# Patient Record
Sex: FEMALE | Race: WHITE | NOT HISPANIC OR LATINO | URBAN - METROPOLITAN AREA
[De-identification: names, ages, dates, MRNs, and addresses within clinical notes are randomized per-mention and may not be internally consistent; named-entity substitution may affect disease eponyms.]

---

## 2023-10-03 ENCOUNTER — OFFICE (OUTPATIENT)
Dept: URBAN - METROPOLITAN AREA CLINIC 76 | Facility: CLINIC | Age: 43
End: 2023-10-03
Payer: OTHER GOVERNMENT

## 2023-10-03 VITALS
DIASTOLIC BLOOD PRESSURE: 81 MMHG | SYSTOLIC BLOOD PRESSURE: 114 MMHG | WEIGHT: 188 LBS | HEART RATE: 67 BPM | OXYGEN SATURATION: 98 % | HEIGHT: 67 IN

## 2023-10-03 DIAGNOSIS — R10.10 UPPER ABDOMINAL PAIN, UNSPECIFIED: ICD-10-CM

## 2023-10-03 DIAGNOSIS — R14.0 ABDOMINAL DISTENSION (GASEOUS): ICD-10-CM

## 2023-10-03 DIAGNOSIS — K59.09 OTHER CONSTIPATION: ICD-10-CM

## 2023-10-03 PROCEDURE — 99204 OFFICE O/P NEW MOD 45 MIN: CPT | Performed by: INTERNAL MEDICINE

## 2023-10-03 RX ORDER — LINACLOTIDE 145 UG/1
145 CAPSULE, GELATIN COATED ORAL
Qty: 90 | Refills: 3 | Status: ACTIVE
Start: 2023-10-03

## 2023-11-07 VITALS
SYSTOLIC BLOOD PRESSURE: 109 MMHG | WEIGHT: 180 LBS | HEART RATE: 55 BPM | RESPIRATION RATE: 17 BRPM | DIASTOLIC BLOOD PRESSURE: 59 MMHG | HEART RATE: 58 BPM | DIASTOLIC BLOOD PRESSURE: 72 MMHG | OXYGEN SATURATION: 97 % | DIASTOLIC BLOOD PRESSURE: 83 MMHG | RESPIRATION RATE: 21 BRPM | SYSTOLIC BLOOD PRESSURE: 102 MMHG | DIASTOLIC BLOOD PRESSURE: 70 MMHG | SYSTOLIC BLOOD PRESSURE: 95 MMHG | TEMPERATURE: 96.5 F | DIASTOLIC BLOOD PRESSURE: 54 MMHG | RESPIRATION RATE: 19 BRPM | DIASTOLIC BLOOD PRESSURE: 60 MMHG | TEMPERATURE: 97.4 F | SYSTOLIC BLOOD PRESSURE: 108 MMHG | SYSTOLIC BLOOD PRESSURE: 107 MMHG | HEART RATE: 63 BPM | RESPIRATION RATE: 15 BRPM | DIASTOLIC BLOOD PRESSURE: 63 MMHG | SYSTOLIC BLOOD PRESSURE: 97 MMHG | RESPIRATION RATE: 14 BRPM | SYSTOLIC BLOOD PRESSURE: 100 MMHG | SYSTOLIC BLOOD PRESSURE: 124 MMHG | DIASTOLIC BLOOD PRESSURE: 65 MMHG | SYSTOLIC BLOOD PRESSURE: 96 MMHG | OXYGEN SATURATION: 99 % | SYSTOLIC BLOOD PRESSURE: 115 MMHG | DIASTOLIC BLOOD PRESSURE: 57 MMHG | DIASTOLIC BLOOD PRESSURE: 55 MMHG | HEART RATE: 62 BPM | OXYGEN SATURATION: 100 % | RESPIRATION RATE: 16 BRPM | HEIGHT: 67 IN | DIASTOLIC BLOOD PRESSURE: 74 MMHG | HEART RATE: 76 BPM | HEART RATE: 65 BPM

## 2023-11-10 ENCOUNTER — AMBULATORY SURGICAL CENTER (OUTPATIENT)
Dept: URBAN - METROPOLITAN AREA SURGERY 17 | Facility: SURGERY | Age: 43
End: 2023-11-10
Payer: OTHER GOVERNMENT

## 2023-11-10 ENCOUNTER — OFFICE (OUTPATIENT)
Dept: URBAN - METROPOLITAN AREA PATHOLOGY 4 | Facility: PATHOLOGY | Age: 43
End: 2023-11-10
Payer: OTHER GOVERNMENT

## 2023-11-10 DIAGNOSIS — D12.5 BENIGN NEOPLASM OF SIGMOID COLON: ICD-10-CM

## 2023-11-10 DIAGNOSIS — D12.2 BENIGN NEOPLASM OF ASCENDING COLON: ICD-10-CM

## 2023-11-10 DIAGNOSIS — R14.0 ABDOMINAL DISTENSION (GASEOUS): ICD-10-CM

## 2023-11-10 DIAGNOSIS — D12.4 BENIGN NEOPLASM OF DESCENDING COLON: ICD-10-CM

## 2023-11-10 DIAGNOSIS — K63.5 POLYP OF COLON: ICD-10-CM

## 2023-11-10 DIAGNOSIS — K62.1 RECTAL POLYP: ICD-10-CM

## 2023-11-10 DIAGNOSIS — K59.09 OTHER CONSTIPATION: ICD-10-CM

## 2023-11-10 LAB
GI HISTOLOGY: A. UNSPECIFIED: (no result)
GI HISTOLOGY: B. UNSPECIFIED: (no result)
GI HISTOLOGY: C. UNSPECIFIED: (no result)
GI HISTOLOGY: D. UNSPECIFIED: (no result)
GI HISTOLOGY: PDF REPORT: (no result)

## 2023-11-10 PROCEDURE — 88305 TISSUE EXAM BY PATHOLOGIST: CPT | Performed by: INTERNAL MEDICINE

## 2023-11-10 PROCEDURE — 45385 COLONOSCOPY W/LESION REMOVAL: CPT | Performed by: INTERNAL MEDICINE

## 2023-11-10 NOTE — SERVICENOTES
Labs look good. A1c is less than 6 so stop the Glimepiride. Watch diet.  RTO 3 months The patient was informed that this exam is not 100% accurate, depending on many factors (such as the preparation). Small lesions can be missed. Please call if symptoms persist or new symptoms develop. The right colon was examined twice.

## 2023-11-10 NOTE — SERVICEHPINOTES
ABBEY MCMANUS  is a  43  female   who presents today for a  Colonoscopy   for   the indications listed below. The updated Patient Profile was reviewed prior to the procedure, in conjunction with the Physical Exam, including medical conditions, surgical procedures, medications, allergies, family history and social history. See Physical Exam time stamp below for date and time of HPI completion.Pre-operatively, I reviewed the indication(s) for the procedure, the risks of the procedure [including but not limited to: unexpected bleeding possibly requiring hospitalization and/or unplanned repeat procedures, perforation possibly requiring surgical treatment, missed lesions and complications of sedation/general anesthesia (also explained by anesthesia staff)]. I have evaluated the patient for risks associated with the planned anesthesia and the procedure to be performed and find the patient an acceptable candidate for IV sedation.Multiple opportunities were provided for any questions or concerns, and all questions were answered satisfactorily before any anesthesia was administered. We will proceed with the planned procedure.br

## 2024-01-16 ENCOUNTER — TRANSCRIBE ORDERS (OUTPATIENT)
Dept: ADMINISTRATIVE | Facility: HOSPITAL | Age: 44
End: 2024-01-16
Payer: OTHER GOVERNMENT

## 2024-01-16 DIAGNOSIS — Z91.89 AT HIGH RISK FOR BREAST CANCER: Primary | ICD-10-CM

## 2024-03-12 ENCOUNTER — HOSPITAL ENCOUNTER (OUTPATIENT)
Dept: MRI IMAGING | Facility: HOSPITAL | Age: 44
Discharge: HOME | End: 2024-03-12
Admitting: NURSE PRACTITIONER
Payer: OTHER GOVERNMENT

## 2024-03-12 DIAGNOSIS — Z91.89 AT HIGH RISK FOR BREAST CANCER: ICD-10-CM

## 2024-03-12 PROCEDURE — 77049 MRI BREAST C-+ W/CAD BI: CPT

## 2024-03-12 PROCEDURE — 0 GADOBENATE DIMEGLUMINE 529 MG/ML SOLUTION: Performed by: NURSE PRACTITIONER

## 2024-03-12 PROCEDURE — A9577 INJ MULTIHANCE: HCPCS | Performed by: NURSE PRACTITIONER

## 2024-03-12 RX ADMIN — GADOBENATE DIMEGLUMINE 20 ML: 529 INJECTION, SOLUTION INTRAVENOUS at 09:46

## 2024-03-22 ENCOUNTER — HOSPITAL ENCOUNTER (OUTPATIENT)
Dept: OTHER | Facility: HOSPITAL | Age: 44
Discharge: HOME | End: 2024-03-22

## 2024-03-26 ENCOUNTER — HOSPITAL ENCOUNTER (OUTPATIENT)
Dept: OTHER | Facility: HOSPITAL | Age: 44
Discharge: HOME | End: 2024-03-26

## 2024-03-29 ENCOUNTER — HOSPITAL ENCOUNTER (OUTPATIENT)
Dept: OTHER | Facility: HOSPITAL | Age: 44
Discharge: HOME | End: 2024-03-29

## 2024-06-05 ENCOUNTER — TRANSCRIBE ORDERS (OUTPATIENT)
Dept: SLEEP MEDICINE | Facility: HOSPITAL | Age: 44
End: 2024-06-05
Payer: OTHER GOVERNMENT

## 2024-06-05 DIAGNOSIS — G47.19 EXCESSIVE DAYTIME SLEEPINESS: Primary | ICD-10-CM

## 2024-06-17 ENCOUNTER — HOSPITAL ENCOUNTER (OUTPATIENT)
Dept: SLEEP MEDICINE | Facility: HOSPITAL | Age: 44
Discharge: HOME | End: 2024-06-17
Admitting: INTERNAL MEDICINE
Payer: OTHER GOVERNMENT

## 2024-06-17 DIAGNOSIS — G47.19 EXCESSIVE DAYTIME SLEEPINESS: ICD-10-CM

## 2024-06-17 PROCEDURE — 95810 POLYSOM 6/> YRS 4/> PARAM: CPT

## 2024-06-18 ENCOUNTER — HOSPITAL ENCOUNTER (OUTPATIENT)
Dept: SLEEP MEDICINE | Facility: HOSPITAL | Age: 44
Discharge: HOME | End: 2024-06-18
Admitting: INTERNAL MEDICINE
Payer: OTHER GOVERNMENT

## 2024-06-18 DIAGNOSIS — G47.19 EXCESSIVE DAYTIME SLEEPINESS: ICD-10-CM

## 2024-06-18 LAB
AMPHET+METHAMPHET UR QL: NEGATIVE
BARBITURATES UR QL SCN: NEGATIVE
BENZODIAZ UR QL SCN: NEGATIVE
CANNABINOIDS SERPL QL: NEGATIVE
COCAINE UR QL: NEGATIVE
FENTANYL UR-MCNC: NEGATIVE NG/ML
METHADONE UR QL SCN: NEGATIVE
OPIATES UR QL: NEGATIVE
OXYCODONE UR QL SCN: NEGATIVE

## 2024-06-18 PROCEDURE — 80307 DRUG TEST PRSMV CHEM ANLYZR: CPT | Performed by: INTERNAL MEDICINE

## 2024-06-18 PROCEDURE — 95805 MULTIPLE SLEEP LATENCY TEST: CPT

## 2024-10-21 ENCOUNTER — HOSPITAL ENCOUNTER (OUTPATIENT)
Dept: OTHER | Facility: HOSPITAL | Age: 44
Discharge: HOME | End: 2024-10-21

## 2025-04-01 ENCOUNTER — TRANSCRIBE ORDERS (OUTPATIENT)
Dept: ADMINISTRATIVE | Facility: HOSPITAL | Age: 45
End: 2025-04-01
Payer: OTHER GOVERNMENT

## 2025-04-01 DIAGNOSIS — Z80.3 FAMILY HISTORY OF MALIGNANT NEOPLASM OF BREAST: Primary | ICD-10-CM

## 2025-04-25 ENCOUNTER — HOSPITAL ENCOUNTER (OUTPATIENT)
Dept: OTHER | Facility: HOSPITAL | Age: 45
Discharge: HOME | End: 2025-04-25

## 2025-05-19 ENCOUNTER — HOSPITAL ENCOUNTER (OUTPATIENT)
Dept: MRI IMAGING | Facility: HOSPITAL | Age: 45
Discharge: HOME | End: 2025-05-19
Admitting: FAMILY MEDICINE
Payer: OTHER GOVERNMENT

## 2025-05-19 DIAGNOSIS — Z80.3 FAMILY HISTORY OF MALIGNANT NEOPLASM OF BREAST: ICD-10-CM

## 2025-05-19 PROCEDURE — 25510000002 GADOBENATE DIMEGLUMINE 529 MG/ML SOLUTION: Performed by: FAMILY MEDICINE

## 2025-05-19 PROCEDURE — 77049 MRI BREAST C-+ W/CAD BI: CPT

## 2025-05-19 PROCEDURE — A9577 INJ MULTIHANCE: HCPCS | Performed by: FAMILY MEDICINE

## 2025-05-19 RX ADMIN — GADOBENATE DIMEGLUMINE 15 ML: 529 INJECTION, SOLUTION INTRAVENOUS at 14:00

## 2025-06-05 ENCOUNTER — OFFICE VISIT (OUTPATIENT)
Dept: NEUROSURGERY | Facility: CLINIC | Age: 45
End: 2025-06-05
Payer: OTHER GOVERNMENT

## 2025-06-05 VITALS
WEIGHT: 174.8 LBS | SYSTOLIC BLOOD PRESSURE: 115 MMHG | HEART RATE: 78 BPM | DIASTOLIC BLOOD PRESSURE: 80 MMHG | HEIGHT: 67 IN | BODY MASS INDEX: 27.44 KG/M2

## 2025-06-05 DIAGNOSIS — M50.30 DEGENERATIVE DISC DISEASE, CERVICAL: ICD-10-CM

## 2025-06-05 DIAGNOSIS — M79.18 CERVICAL MYOFASCIAL PAIN SYNDROME: ICD-10-CM

## 2025-06-05 DIAGNOSIS — M54.2 CERVICALGIA: Primary | ICD-10-CM

## 2025-06-05 RX ORDER — LIDOCAINE 50 MG/G
1 PATCH TOPICAL EVERY 24 HOURS
Qty: 30 PATCH | Refills: 5 | Status: SHIPPED | OUTPATIENT
Start: 2025-06-05

## 2025-06-05 RX ORDER — BUPROPION HCL 300 MG
300 TABLET, EXTENDED RELEASE 24 HR ORAL
COMMUNITY
Start: 2025-05-21

## 2025-06-05 RX ORDER — THYROID 90 MG/1
90 TABLET ORAL DAILY
COMMUNITY

## 2025-06-05 RX ORDER — LEVOTHYROXINE SODIUM 100 MCG
100 TABLET ORAL
COMMUNITY
Start: 2025-03-02

## 2025-06-05 RX ORDER — PHENTERMINE HYDROCHLORIDE 15 MG/1
15 CAPSULE ORAL EVERY MORNING
COMMUNITY
Start: 2025-05-09

## 2025-06-05 RX ORDER — MODAFINIL 200 MG/1
200 TABLET ORAL DAILY
COMMUNITY
Start: 2025-03-03

## 2025-06-05 RX ORDER — TOPIRAMATE SPINKLE 25 MG/1
25 CAPSULE ORAL
COMMUNITY
Start: 2025-05-09

## 2025-06-05 NOTE — PROGRESS NOTES
"Chief Complaint  Neck Pain, Arm Pain (LEFT ELBOW), and Headache    Subjective          Grisel Chávez who is a 44 y.o. year old female who presents to [facility] for evaluation of cervical spine.    History of Present Illness  The patient is a 44-year-old female presenting for neck pain.    She has been experiencing chronic neck pain for several years, which has progressively worsened over the past year. The pain is predominantly localized to the posterior aspect of her neck, with limited range of motion to the left. She also reports pain radiating down her left arm, specifically at the elbow and trapezius muscle adjacent to her spine. This area consistently feels like a knot, which physical therapy has been unable to alleviate. Despite attempts at neck stretches and home traction, she continues to experience morning stiffness and pain. She has not undergone dry needling, but it was suggested as a potential next step. She is currently not on any analgesics or muscle relaxants. She has tried various pillows and uses a TENS unit at home, but these interventions have not provided relief.    She has a history of tennis elbow in her right arm, which was successfully treated with dry needling. She believes her current symptoms may be related to her workstation setup and has made adjustments to keep her desk more elevated. She has been referred to physical therapy by her primary care physician but has not consulted an orthopedist.     History of Previous Spinal Surgery?: No    Nicotine use: non-smoker    BMI: Body mass index is 27.38 kg/m².      Review of Systems   Musculoskeletal:  Positive for myalgias, neck pain and neck stiffness.   All other systems reviewed and are negative.       Objective   Vital Signs:   /80 (BP Location: Left arm, Patient Position: Sitting)   Pulse 78   Ht 170.2 cm (67\")   Wt 79.3 kg (174 lb 12.8 oz)   BMI 27.38 kg/m²       Physical Exam  Vitals " reviewed.   Constitutional:       Appearance: Normal appearance.   Musculoskeletal:      Right shoulder: No tenderness. Normal range of motion.      Left shoulder: No tenderness. Normal range of motion.      Cervical back: Tenderness present. Pain with movement present. Decreased range of motion.   Neurological:      Mental Status: She is alert.      Motor: Motor strength is normal.     Deep Tendon Reflexes:      Reflex Scores:       Tricep reflexes are 2+ on the right side and 2+ on the left side.       Bicep reflexes are 2+ on the right side and 2+ on the left side.       Brachioradialis reflexes are 2+ on the right side and 2+ on the left side.       Neurological Exam  Mental Status  Alert.    Motor   Strength is 5/5 throughout all four extremities.    Sensory  Sensation is intact to light touch, pinprick, vibration and proprioception in all four extremities.    Reflexes                                            Right                      Left  Brachioradialis                    2+                         2+  Biceps                                 2+                         2+  Triceps                                2+                         2+    Gait  Casual gait is normal including stance, stride, and arm swing.      Physical Exam  Cranial Nerve Examination    CN V: Facial sensation is intact bilaterally.    Motor Examination    Strength: Hand  strength is normal bilaterally. Upper limb strength is normal bilaterally.    Upper Extremity Drift Test: No upper extremity drift.    Neck: Tenderness on the left side of the neck.       Result Review :       Data reviewed : Radiologic studies MRI Cervical Spine on 4/25/25 at [facility] personally reviewed and interpreted. Degenerative changes, most significant at C3/4 where there is a right subarticular/foraminal disc herniation/protrusion resulting in mild spinal canal and moderately severe right, moderate left foraminal narrowing. Disc bulges at C5/6  and C6/7.          Assessment and Plan    Diagnoses and all orders for this visit:    1. Cervicalgia (Primary)  -     Ambulatory Referral to Physical Therapy for Evaluation & Treatment  -     lidocaine (LIDODERM) 5 %; Place 1 patch on the skin as directed by provider Daily. Remove & Discard patch within 12 hours or as directed by MD  Dispense: 30 patch; Refill: 5    2. Cervical myofascial pain syndrome  -     Ambulatory Referral to Physical Therapy for Evaluation & Treatment  -     lidocaine (LIDODERM) 5 %; Place 1 patch on the skin as directed by provider Daily. Remove & Discard patch within 12 hours or as directed by MD  Dispense: 30 patch; Refill: 5    3. Degenerative disc disease, cervical  -     Ambulatory Referral to Physical Therapy for Evaluation & Treatment        Assessment & Plan  1. Cervicalgia.     - MRI results reveal disc degeneration at multiple levels, causing minor disc bulges without significant spinal cord narrowing. A right-sided disc protrusion at the C3-4 level may cause radiating symptoms into the shoulder, but symptoms are more pronounced on the right side than the left. The pain is likely musculoskeletal due to disc weakening, straining the joints and muscles.     - Surgical intervention is not recommended.     - Dry needling is advised to alleviate muscle tension and pain. A referral for dry needling will be sent.     - Prescribed lidocaine patches for pain management.     - A muscle relaxant prescription will be provided for use as needed, particularly at bedtime after work or daily activities.     - If lidocaine patches are effective, they should be continued.     - A topical compound pain cream may be considered if pain becomes more bothersome, though it would need to be paid for out-of-pocket.     - Trigger point injections with pain management could be considered if pain persists.         Follow Up   Return if symptoms worsen or fail to improve.  Patient was given instructions and  counseling regarding her condition or for health maintenance advice.     Patient or patient representative verbalized consent for the use of Ambient Listening during the visit with  CIHTO Kinsey for chart documentation. 6/5/2025  08:11 EDT    -Referral for dry needling  -Lidocaine patch  -Follow up as needed